# Patient Record
Sex: FEMALE | Race: BLACK OR AFRICAN AMERICAN
[De-identification: names, ages, dates, MRNs, and addresses within clinical notes are randomized per-mention and may not be internally consistent; named-entity substitution may affect disease eponyms.]

---

## 2020-06-28 ENCOUNTER — HOSPITAL ENCOUNTER (EMERGENCY)
Dept: HOSPITAL 72 - EMR | Age: 30
Discharge: HOME | End: 2020-06-28
Payer: MEDICAID

## 2020-06-28 VITALS — SYSTOLIC BLOOD PRESSURE: 134 MMHG | DIASTOLIC BLOOD PRESSURE: 81 MMHG

## 2020-06-28 VITALS — SYSTOLIC BLOOD PRESSURE: 136 MMHG | DIASTOLIC BLOOD PRESSURE: 78 MMHG

## 2020-06-28 VITALS — WEIGHT: 145 LBS | HEIGHT: 70 IN | BODY MASS INDEX: 20.76 KG/M2

## 2020-06-28 DIAGNOSIS — I10: ICD-10-CM

## 2020-06-28 DIAGNOSIS — E86.0: ICD-10-CM

## 2020-06-28 DIAGNOSIS — K52.9: Primary | ICD-10-CM

## 2020-06-28 LAB
ADD MANUAL DIFF: NO
ALBUMIN SERPL-MCNC: 4 G/DL (ref 3.4–5)
ALBUMIN/GLOB SERPL: 0.7 {RATIO} (ref 1–2.7)
ALP SERPL-CCNC: 78 U/L (ref 46–116)
ALT SERPL-CCNC: 13 U/L (ref 12–78)
ANION GAP SERPL CALC-SCNC: 9 MMOL/L (ref 5–15)
APPEARANCE UR: (no result)
APTT PPP: (no result) S
AST SERPL-CCNC: 17 U/L (ref 15–37)
BASOPHILS NFR BLD AUTO: 0.6 % (ref 0–2)
BILIRUB SERPL-MCNC: 0.5 MG/DL (ref 0.2–1)
BUN SERPL-MCNC: 11 MG/DL (ref 7–18)
CALCIUM SERPL-MCNC: 9.2 MG/DL (ref 8.5–10.1)
CHLORIDE SERPL-SCNC: 96 MMOL/L (ref 98–107)
CO2 SERPL-SCNC: 27 MMOL/L (ref 21–32)
CREAT SERPL-MCNC: 0.9 MG/DL (ref 0.55–1.3)
EOSINOPHIL NFR BLD AUTO: 0.7 % (ref 0–3)
ERYTHROCYTE [DISTWIDTH] IN BLOOD BY AUTOMATED COUNT: 12 % (ref 11.6–14.8)
GLOBULIN SER-MCNC: 6 G/DL
GLUCOSE UR STRIP-MCNC: NEGATIVE MG/DL
HCT VFR BLD CALC: 51.1 % (ref 37–47)
HGB BLD-MCNC: 15.7 G/DL (ref 12–16)
KETONES UR QL STRIP: NEGATIVE
LEUKOCYTE ESTERASE UR QL STRIP: NEGATIVE
LYMPHOCYTES NFR BLD AUTO: 19.3 % (ref 20–45)
MCV RBC AUTO: 95 FL (ref 80–99)
MONOCYTES NFR BLD AUTO: 6.3 % (ref 1–10)
NEUTROPHILS NFR BLD AUTO: 73.2 % (ref 45–75)
NITRITE UR QL STRIP: NEGATIVE
PH UR STRIP: 6.5 [PH] (ref 4.5–8)
PLATELET # BLD: 230 K/UL (ref 150–450)
POTASSIUM SERPL-SCNC: 3.6 MMOL/L (ref 3.5–5.1)
PROT UR QL STRIP: NEGATIVE
RBC # BLD AUTO: 5.36 M/UL (ref 4.2–5.4)
SODIUM SERPL-SCNC: 132 MMOL/L (ref 136–145)
SP GR UR STRIP: 1 (ref 1–1.03)
UROBILINOGEN UR-MCNC: NORMAL MG/DL (ref 0–1)
WBC # BLD AUTO: 9 K/UL (ref 4.8–10.8)

## 2020-06-28 PROCEDURE — 83690 ASSAY OF LIPASE: CPT

## 2020-06-28 PROCEDURE — 36415 COLL VENOUS BLD VENIPUNCTURE: CPT

## 2020-06-28 PROCEDURE — 85025 COMPLETE CBC W/AUTO DIFF WBC: CPT

## 2020-06-28 PROCEDURE — 81025 URINE PREGNANCY TEST: CPT

## 2020-06-28 PROCEDURE — 96375 TX/PRO/DX INJ NEW DRUG ADDON: CPT

## 2020-06-28 PROCEDURE — 81003 URINALYSIS AUTO W/O SCOPE: CPT

## 2020-06-28 PROCEDURE — 96361 HYDRATE IV INFUSION ADD-ON: CPT

## 2020-06-28 PROCEDURE — 96374 THER/PROPH/DIAG INJ IV PUSH: CPT

## 2020-06-28 PROCEDURE — 99284 EMERGENCY DEPT VISIT MOD MDM: CPT

## 2020-06-28 PROCEDURE — 80053 COMPREHEN METABOLIC PANEL: CPT

## 2020-06-28 NOTE — NUR
ER DISCHARGE NOTE:



Pt is cleared to be discharged per Dr. Oglesby, pt is aox4, on room air, with 
stable vital signs. pt was given dc and prescription instructions, pt was able 
to verbalize understanding, pt id band and iv site removed without 
complications. pt is able to ambulate with steady gait. pt took all belongings.

## 2020-06-28 NOTE — NUR
ED Nurse Note:



Pt was brought in by ambulance from a motel d/t abdominal pain noted with 
nausea/vomiting/dizziness started x 3 days. Pt is AOx4 noted with weakness. 
Placed on bed and gown; hooked to cardiac monitor, VSS, on RA, afebrile on 
triage, will continue to monitor.

## 2020-06-28 NOTE — EMERGENCY ROOM REPORT
History of Present Illness


General


Chief Complaint:  Abdominal Pain


Source:  Patient





Present Illness


HPI


Disclaimer: Please note that this report is being documented using [employer] 

technology. This can lead to erroneous entry secondary to incorrect 

interpretation by the dictating instrument.





HPI: 29-year-old female presents for evaluation of vomiting and diarrhea.  

Symptoms present for 3 days.  The patient reports eating some takeout food 3 

days ago after which developed abdominal cramping, persistent vomiting and 

diarrhea.  States she is vomiting after eating and drinking almost anything.  

Reports brought abdominal cramping but no specific point tenderness.  Denies 

fever or chills.  Denies hematemesis, melena or hematochezia.  Denies dysuria, 

hematuria, flank pain, vaginal discharge or vaginal bleeding.  Denies alcohol 

or drug use.


 


PMH: Hypertension


 


PSH: Denies


 


Allergies: Denies


 


Social Hx: Denies


Allergies:  


Coded Allergies:  


     No Known Allergies (Unverified , 6/28/20)





COVID-19 Screening


Contact w/high risk pt:  No


Recent Travel to affected area:  No


Experienced COVID-19 symptoms?:  No


COVID-19 Testing performed PTA:  No





Patient History


Last Menstrual Period:  na


Pregnant Now:  No





Nursing Documentation-PMH


Past Medical History:  No History, Except For


Hx Hypertension:  Yes





Review of Systems


All Other Systems:  negative except mentioned in HPI





Physical Exam





Vital Signs








  Date Time  Temp Pulse Resp B/P (MAP) Pulse Ox O2 Delivery O2 Flow Rate FiO2


 


6/28/20 11:00 99.1 81 19 134/81 (98) 99 Room Air  





 





General: Awake and alert, appears fatigued but no acute distress


HEENT: NC/AT. EOMI. PERRLA.  Very dry mucous membranes


Cardiovascular: RRR.  S1 and S2 normal.  No murmur appreciated


Resp: Normal work of breathing. No cough, wheezing or crackles appreciated


Abdomen: Abdomen is soft, nondistended.  Mild tenderness diffusely.  No rebound

, no masses, no guarding.


Skin: Intact.  No abrasions, laceration or rash over the exposed skin


MSK: Normal tone and bulk. Moving all extremities.  No obvious deformity.


Neuro: Awake and alert.  Mentating appropriately.





Medical Decision Making


Diagnostic Impression:  


 Primary Impression:  


 Dehydration


 Additional Impression:  


 Gastroenteritis


ER Course


29-year-old female presenting for evaluation of vomiting and diarrhea after 

eating takeout food 3 days ago.  Differential includes but is not limited to 

gastritis, gastroenteritis, pancreatitis, cholecystitis, food poisoning, viral 

syndrome among others.  Patient was placed on monitor, EKG was performed 

nonischemic.  Start IV hydration, antiemetics, antacids.  Blood work shows





1400:


Patient reports feeling much better after IV fluids and antiemetics.  Labs are 

returned within normal limits.  No evidence of urinary tract infection or 

significant metabolic abnormalities.  Patient is tolerating p.o. at this time.  

Belly remained soft.  No dictation for emergent imaging at this time.  Will 

discharge with antiemetics.  Referred to outpatient clinic.  Instructed to 

return with new or worsening symptoms.  She understands and agrees with this 

treatment plan.





Laboratory Tests








Test


  6/28/20


11:10 6/28/20


12:00


 


White Blood Count


  9.0 K/UL


(4.8-10.8) 


 


 


Red Blood Count


  5.36 M/UL


(4.20-5.40) 


 


 


Hemoglobin


  15.7 G/DL


(12.0-16.0) 


 


 


Hematocrit


  51.1 %


(37.0-47.0)  H 


 


 


Mean Corpuscular Volume 95 FL (80-99)   


 


Mean Corpuscular Hemoglobin


  29.3 PG


(27.0-31.0) 


 


 


Mean Corpuscular Hemoglobin


Concent 30.8 G/DL


(32.0-36.0)  L 


 


 


Red Cell Distribution Width


  12.0 %


(11.6-14.8) 


 


 


Platelet Count


  230 K/UL


(150-450) 


 


 


Mean Platelet Volume


  8.6 FL


(6.5-10.1) 


 


 


Neutrophils (%) (Auto)


  73.2 %


(45.0-75.0) 


 


 


Lymphocytes (%) (Auto)


  19.3 %


(20.0-45.0)  L 


 


 


Monocytes (%) (Auto)


  6.3 %


(1.0-10.0) 


 


 


Eosinophils (%) (Auto)


  0.7 %


(0.0-3.0) 


 


 


Basophils (%) (Auto)


  0.6 %


(0.0-2.0) 


 


 


Sodium Level


  132 MMOL/L


(136-145)  L 


 


 


Potassium Level


  3.6 MMOL/L


(3.5-5.1) 


 


 


Chloride Level


  96 MMOL/L


()  L 


 


 


Carbon Dioxide Level


  27 MMOL/L


(21-32) 


 


 


Anion Gap


  9 mmol/L


(5-15) 


 


 


Blood Urea Nitrogen


  11 mg/dL


(7-18) 


 


 


Creatinine


  0.9 MG/DL


(0.55-1.30) 


 


 


Estimated Glomerular


Filtration Rate > 60 mL/min


(>60) 


 


 


Glucose Level


  143 MG/DL


()  H 


 


 


Calcium Level


  9.2 MG/DL


(8.5-10.1) 


 


 


Total Bilirubin


  0.5 MG/DL


(0.2-1.0) 


 


 


Aspartate Amino Transferase


(AST) 17 U/L (15-37)


  


 


 


Alanine Aminotransferase (ALT)


  13 U/L (12-78)


  


 


 


Alkaline Phosphatase


  78 U/L


() 


 


 


Total Protein


  10.0 G/DL


(6.4-8.2)  H 


 


 


Albumin


  4.0 G/DL


(3.4-5.0) 


 


 


Globulin 6.0 g/dL   


 


Albumin/Globulin Ratio


  0.7 (1.0-2.7)


L 


 


 


Lipase


  78 U/L


() 


 


 


Urine Color  Pale yellow  


 


Urine Appearance


  


  Slightly


cloudy


 


Urine pH  6.5 (4.5-8.0)  


 


Urine Specific Gravity


  


  1.005


(1.005-1.035)


 


Urine Protein


  


  Negative


(NEGATIVE)


 


Urine Glucose (UA)


  


  Negative


(NEGATIVE)


 


Urine Ketones


  


  Negative


(NEGATIVE)


 


Urine Blood


  


  Negative


(NEGATIVE)


 


Urine Nitrite


  


  Negative


(NEGATIVE)


 


Urine Bilirubin


  


  Negative


(NEGATIVE)


 


Urine Urobilinogen


  


  Normal MG/DL


(0.0-1.0)


 


Urine Leukocyte Esterase


  


  Negative


(NEGATIVE)


 


Urine RBC


  


  0 /HPF (0 - 2)


 


 


Urine WBC


  


  0 /HPF (0 - 2)


 


 


Urine Squamous Epithelial


Cells 


  Moderate /LPF


(NONE/OCC)  H


 


Urine Bacteria


  


  Few /HPF


(NONE)


 


Urine HCG, Qualitative


  


  Negative


(NEGATIVE)








EKG Diagnostic Results


EKG Time:  11:13


Rate:  normal


Rhythm:  NSR


ST Segments:  no acute changes


Other Impression


Sinus rhythm, normal axis, normal intervals, no ST segment changes





Rhythm Strip Diag. Results


Rhythm Strip Time:  11:13


EP Interpretation:  yes


Rate:  69


Rhythm:  NSR, no PVC's, no ectopy





Last Vital Signs








  Date Time  Temp Pulse Resp B/P (MAP) Pulse Ox O2 Delivery O2 Flow Rate FiO2


 


6/28/20 11:00 99.1 81 19 134/81 (98) 99 Room Air  








Disposition:  HOME, SELF-CARE


Condition:  Stable


Scripts


Ondansetron Odt* (ZOFRAN ODT*) 4 Mg Tab.rapdis


4 MG BC EVERY 6 HOURS PRN for Nausea & Vomiting, #20 TAB 0 Refills


   Prov: Christiano Oglesby MD         6/28/20











Christiano Oglesby MD Jun 28, 2020 11:18